# Patient Record
Sex: MALE | Race: WHITE | Employment: FULL TIME | URBAN - METROPOLITAN AREA
[De-identification: names, ages, dates, MRNs, and addresses within clinical notes are randomized per-mention and may not be internally consistent; named-entity substitution may affect disease eponyms.]

---

## 2019-11-13 ENCOUNTER — HOSPITAL ENCOUNTER (OUTPATIENT)
Dept: CT IMAGING | Age: 59
Discharge: HOME | End: 2019-11-13
Attending: FAMILY MEDICINE
Payer: COMMERCIAL

## 2019-11-13 DIAGNOSIS — Z72.0 TOBACCO ABUSE: ICD-10-CM

## 2019-11-13 PROCEDURE — 71250 CT THORAX DX C-: CPT

## 2021-04-07 ENCOUNTER — TRANSCRIBE ORDER (OUTPATIENT)
Dept: SCHEDULING | Age: 61
End: 2021-04-07

## 2021-04-07 DIAGNOSIS — Z72.0 TOBACCO USE: Primary | ICD-10-CM

## 2021-04-23 ENCOUNTER — HOSPITAL ENCOUNTER (OUTPATIENT)
Dept: CT IMAGING | Age: 61
Discharge: HOME | End: 2021-04-23
Attending: FAMILY MEDICINE
Payer: OTHER GOVERNMENT

## 2021-04-23 VITALS — BODY MASS INDEX: 27.09 KG/M2 | WEIGHT: 200 LBS | HEIGHT: 72 IN

## 2021-04-23 DIAGNOSIS — Z72.0 TOBACCO USE: ICD-10-CM

## 2021-04-23 PROCEDURE — 71271 CT THORAX LUNG CANCER SCR C-: CPT

## 2023-04-21 ENCOUNTER — OFFICE VISIT (OUTPATIENT)
Dept: PULMONOLOGY | Age: 63
End: 2023-04-21

## 2023-04-21 VITALS
TEMPERATURE: 96.9 F | DIASTOLIC BLOOD PRESSURE: 78 MMHG | WEIGHT: 206.3 LBS | BODY MASS INDEX: 28.88 KG/M2 | HEIGHT: 71 IN | OXYGEN SATURATION: 96 % | HEART RATE: 89 BPM | SYSTOLIC BLOOD PRESSURE: 158 MMHG | RESPIRATION RATE: 15 BRPM

## 2023-04-21 DIAGNOSIS — Z87.891 HISTORY OF CIGARETTE SMOKING: ICD-10-CM

## 2023-04-21 DIAGNOSIS — R91.1 PULMONARY NODULE: Primary | ICD-10-CM

## 2023-04-21 DIAGNOSIS — J44.9 CHRONIC OBSTRUCTIVE PULMONARY DISEASE, UNSPECIFIED COPD TYPE (HCC): ICD-10-CM

## 2023-04-21 LAB
EXPIRATORY TIME: NORMAL
FEF 25-75% %PRED-PRE: NORMAL
FEF 25-75% PRED: NORMAL
FEF 25-75%-PRE: NORMAL
FEV1 %PRED-PRE: NORMAL %
FEV1 PRED: NORMAL
FEV1/FVC %PRED-PRE: NORMAL
FEV1/FVC PRED: NORMAL
FEV1/FVC: NORMAL %
FEV1: 2.26 L
FVC %PRED-PRE: NORMAL %
FVC PRED: NORMAL
FVC: 3.84 L
PEF %PRED-PRE: NORMAL
PEF PRED: NORMAL
PEF-PRE: NORMAL

## 2023-04-21 RX ORDER — ALBUTEROL SULFATE 90 UG/1
2 AEROSOL, METERED RESPIRATORY (INHALATION) EVERY 6 HOURS PRN
Qty: 1 EACH | Refills: 11 | Status: SHIPPED | OUTPATIENT
Start: 2023-04-21

## 2023-04-21 ASSESSMENT — PULMONARY FUNCTION TESTS
FEV1: 2.26
FVC: 3.84

## 2023-04-21 NOTE — PATIENT INSTRUCTIONS
Nicotine Cessation and Management Program    The Nicotine Cessation Program is a 5-cession class that utilized the "[employer] Kit plus group support. Gabriella Pfeiffer combines several powerful treatment elements - including mindfulness/hypnosis, medication recommendations and a patented simulated cigarette - to produce a potent stop - smoking treatment. The program was developed by Dr. Tyrese Vazquez,  of the [facility]. Participants are welcome to continue with monthly unlimited group support meetings upon graduation. At graduation, a certificate of completion will be provided. This program will combine powerful treatments to help you break free from cigarettes. Ease off nicotine    Switch off to cigarette brands that deliver less and less nicotine. We call it warm chicken quitting. Consider stop-smoking medicine    Choose from 7 medicines that can keep you comfortable as you quit. Take a new look at the patch    Discover a new way to use nicotine patches that dramatically increases your change of success. .. Use your mind to help    Relax as you develop the respect for your body that naturally lends to freedom from cigarettes. Break the smoking habit. Your smoking habit may be strong, but you can outsmart it with six simple techniques. You will also receive the Lexy Crowe which includes an informative guidebook, a relaxing hypnosis CD and a patented cigarette substitute. Program is covered by Marg[employer] Kuhn and most insurance providers. Learn More: For more information or to sign up for the [facility] Nicotine Cessation and Management Program, please call [phone]    Classes are held at [facility]   [place] [address] [place] [address], [id number]  [employer].

## 2023-04-21 NOTE — PROGRESS NOTES
Name:  Flaquita Webster  YOB: 1960   MRN: [id number]      Office Visit: 4/21/2023        ASSESSMENT AND PLAN:  (Medical Decision Making)    Impression: 58 y.o. male active smoker with small chronic pulmonary nodules, COPD though essentially asymptomatic    1. Pulmonary nodule  Very small. The report mentions up to 6 mm, but the description only mentions anything up to 4 mm and does not appear significantly changed compared to 2021 for me. Would recommend just continuing with lung cancer screening and will order for him 1 year follow-up. He can follow-up here at that time. - Spirometry Without Bronchodilator  - CT LUNG SCREENING; Future    2. Chronic obstructive pulmonary disease, unspecified COPD type (HCC)  Moderate obstruction, but really asymptomatic. Did suggest he at least have an albuterol inhaler to use as needed and if he started needing to use it regularly we could consider daily inhaler or if he was having any bronchitis or exacerbations. Most importantly we discussed smoking cessation in detail. Encouraged him to continue aerobic exercise 30 minutes a day. - albuterol sulfate HFA (PROVENTIL;VENTOLIN;PROAIR) 108 (90 Base) MCG/ACT inhaler; Inhale 2 puffs into the lungs every 6 hours as needed for Wheezing  Dispense: 1 each; Refill: 11    3. History of cigarette smoking  Total smoking cessation is advised. Discussed various smoking cessation products including pills, patches, inhaler, gum, e-cigarettes, weaning self off, \"cold turkey\", and smoking cessation classes. Discussed also with patient disease risk of ongoing smoking including CVA, lung cancer, and heart disease. At this point, patient's commitment to quitting is moderate. Approximately 3 minutes were spent counseling patient regarding smoking cessation. Discussed with patient current guidelines for screening for lung cancer.   Current recommendations are to obtain yearly screening LDCT yearly from age 50-69, or until